# Patient Record
Sex: FEMALE | Race: WHITE | NOT HISPANIC OR LATINO | Employment: FULL TIME | URBAN - METROPOLITAN AREA
[De-identification: names, ages, dates, MRNs, and addresses within clinical notes are randomized per-mention and may not be internally consistent; named-entity substitution may affect disease eponyms.]

---

## 2017-12-05 ENCOUNTER — APPOINTMENT (OUTPATIENT)
Dept: WOMENS IMAGING | Facility: HOSPITAL | Age: 57
End: 2017-12-05

## 2017-12-05 PROCEDURE — 77063 BREAST TOMOSYNTHESIS BI: CPT | Performed by: RADIOLOGY

## 2017-12-05 PROCEDURE — 77067 SCR MAMMO BI INCL CAD: CPT | Performed by: RADIOLOGY

## 2019-03-07 ENCOUNTER — APPOINTMENT (OUTPATIENT)
Dept: WOMENS IMAGING | Facility: HOSPITAL | Age: 59
End: 2019-03-07

## 2019-03-07 PROCEDURE — 77067 SCR MAMMO BI INCL CAD: CPT | Performed by: RADIOLOGY

## 2019-03-07 PROCEDURE — 77063 BREAST TOMOSYNTHESIS BI: CPT | Performed by: RADIOLOGY

## 2019-12-16 ENCOUNTER — APPOINTMENT (OUTPATIENT)
Dept: WOMENS IMAGING | Facility: HOSPITAL | Age: 59
End: 2019-12-16

## 2019-12-16 PROCEDURE — 76641 ULTRASOUND BREAST COMPLETE: CPT | Performed by: RADIOLOGY

## 2019-12-16 PROCEDURE — G0279 TOMOSYNTHESIS, MAMMO: HCPCS | Performed by: RADIOLOGY

## 2019-12-16 PROCEDURE — 77065 DX MAMMO INCL CAD UNI: CPT | Performed by: RADIOLOGY

## 2019-12-16 PROCEDURE — 77061 BREAST TOMOSYNTHESIS UNI: CPT | Performed by: RADIOLOGY

## 2020-01-03 ENCOUNTER — APPOINTMENT (OUTPATIENT)
Dept: WOMENS IMAGING | Facility: HOSPITAL | Age: 60
End: 2020-01-03

## 2020-01-03 PROCEDURE — 76942 ECHO GUIDE FOR BIOPSY: CPT | Performed by: RADIOLOGY

## 2020-01-03 PROCEDURE — 19000 PUNCTURE ASPIR CYST BREAST: CPT | Performed by: RADIOLOGY

## 2020-03-10 ENCOUNTER — APPOINTMENT (OUTPATIENT)
Dept: WOMENS IMAGING | Facility: HOSPITAL | Age: 60
End: 2020-03-10

## 2020-09-18 ENCOUNTER — APPOINTMENT (OUTPATIENT)
Dept: WOMENS IMAGING | Facility: HOSPITAL | Age: 60
End: 2020-09-18

## 2020-09-18 PROCEDURE — 77061 BREAST TOMOSYNTHESIS UNI: CPT | Performed by: RADIOLOGY

## 2020-09-18 PROCEDURE — 76641 ULTRASOUND BREAST COMPLETE: CPT | Performed by: RADIOLOGY

## 2020-09-18 PROCEDURE — G0279 TOMOSYNTHESIS, MAMMO: HCPCS | Performed by: RADIOLOGY

## 2020-09-18 PROCEDURE — 77065 DX MAMMO INCL CAD UNI: CPT | Performed by: RADIOLOGY

## 2020-10-02 ENCOUNTER — APPOINTMENT (OUTPATIENT)
Dept: WOMENS IMAGING | Facility: HOSPITAL | Age: 60
End: 2020-10-02

## 2020-10-02 PROCEDURE — 76642 ULTRASOUND BREAST LIMITED: CPT | Performed by: RADIOLOGY

## 2021-04-16 ENCOUNTER — APPOINTMENT (OUTPATIENT)
Dept: WOMENS IMAGING | Facility: HOSPITAL | Age: 61
End: 2021-04-16

## 2021-04-16 PROCEDURE — 77067 SCR MAMMO BI INCL CAD: CPT | Performed by: RADIOLOGY

## 2021-04-16 PROCEDURE — 77063 BREAST TOMOSYNTHESIS BI: CPT | Performed by: RADIOLOGY

## 2022-08-30 ENCOUNTER — APPOINTMENT (OUTPATIENT)
Dept: WOMENS IMAGING | Facility: HOSPITAL | Age: 62
End: 2022-08-30

## 2022-08-30 PROCEDURE — 77067 SCR MAMMO BI INCL CAD: CPT | Performed by: RADIOLOGY

## 2022-08-30 PROCEDURE — 77063 BREAST TOMOSYNTHESIS BI: CPT | Performed by: RADIOLOGY

## 2022-09-09 ENCOUNTER — TRANSCRIBE ORDERS (OUTPATIENT)
Dept: ADMINISTRATIVE | Facility: HOSPITAL | Age: 62
End: 2022-09-09

## 2022-09-09 DIAGNOSIS — Z82.49 FAMILY HISTORY OF CORONARY ARTERY DISEASE: Primary | ICD-10-CM

## 2022-11-22 ENCOUNTER — APPOINTMENT (OUTPATIENT)
Dept: CT IMAGING | Facility: HOSPITAL | Age: 62
End: 2022-11-22

## 2023-03-14 ENCOUNTER — DOCUMENTATION (OUTPATIENT)
Dept: INTERNAL MEDICINE | Facility: HOSPITAL | Age: 63
End: 2023-03-14
Payer: COMMERCIAL

## 2023-04-26 ENCOUNTER — OFFICE VISIT (OUTPATIENT)
Dept: CARDIOLOGY | Facility: CLINIC | Age: 63
End: 2023-04-26
Payer: COMMERCIAL

## 2023-04-26 VITALS
BODY MASS INDEX: 25.11 KG/M2 | HEART RATE: 64 BPM | SYSTOLIC BLOOD PRESSURE: 110 MMHG | WEIGHT: 160 LBS | DIASTOLIC BLOOD PRESSURE: 72 MMHG | HEIGHT: 67 IN

## 2023-04-26 DIAGNOSIS — Z82.49 FAMILY HISTORY OF MYOCARDIAL INFARCTION: Primary | ICD-10-CM

## 2023-04-26 DIAGNOSIS — E78.2 MIXED HYPERLIPIDEMIA: ICD-10-CM

## 2023-04-26 DIAGNOSIS — R01.1 HEART MURMUR: ICD-10-CM

## 2023-04-26 NOTE — PROGRESS NOTES
Date of Office Visit: 2023  Encounter Provider: Perla Smith MD  Place of Service: [facility] CARDIOLOGY  Patient Name: Samreen Linda  :1960      Patient ID:  Samreen Linda is a 62 y.o. female is here for family history of myocardial infarction.          History of Present Illness    She has no medical illnesses requiring prescription medications.  She is here for family history myocardial infarction.    Her dad had myocardial infarction between the age of 60-65, her mom had myocardial infarction to the age of 60-65.  Her maternal grandparents both had coronary artery disease with her maternal grandmother dying at 76 with myocardial infarction and her maternal grandfather dying at 85 with myocardial infarction.  Her paternal grandfather  of myocardial infarction at 76.  She is , has 1 child, works as an  in [employer], has never smoked, uses no alcohol, has tea daily and no drugs.    Labs done 10/2022 showed total cholesterol 166, HDL 42, triglycerides 94, , non-, magnesium 2.3, glucose 92, otherwise normal CMP, normal CBC.    She gardens and walks 30 minutes 5 days weekly.  She is able to do this without chest tightness or pressure.  Sometimes she has some mild chest pressure when she is anxious.  Her job is stressful.  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  She does not feel her heart racing or skipping is had no dizziness, syncope or falls.  She has no orthopnea or PND.  Generally she is feeling well.     Past Medical History:   Diagnosis Date   • Family history of heart attack    • Mixed hyperlipidemia 2023         Past Surgical History:   Procedure Laterality Date   • APPENDECTOMY     •  SECTION     • FOOT SURGERY         Current Outpatient Medications on File Prior to Visit   Medication Sig Dispense Refill   •  "Calcium-Phosphorus-Vitamin D (GELATIN/CALCIUM/VITAMIN D PO) Take 1 each by mouth Daily.     • Cholecalciferol (VITAMIN D) 1000 units tablet Take 5 tablets by mouth Daily.     • MAGNESIUM PO Take  by mouth.     • [DISCONTINUED] DEXCHLORPHEN-PSE-METHSCOP PO Take  by mouth.       No current facility-administered medications on file prior to visit.       Social History     Socioeconomic History   • Marital status:    • Number of children: 1   Tobacco Use   • Smoking status: Never     Passive exposure: Never   • Smokeless tobacco: Never   • Tobacco comments:     Caffeine: tea daily   Vaping Use   • Vaping Use: Never used   Substance and Sexual Activity   • Alcohol use: No   • Drug use: Never   • Sexual activity: Yes     Birth control/protection: Post-menopausal           ROS    Procedures    ECG 12 Lead    Date/Time: 4/26/2023 11:28 AM  Performed by: Perla Smith MD  Authorized by: Perla Smith MD   Comparison: compared with previous ECG   Similar to previous ECG  Rhythm: sinus rhythm    Clinical impression: normal ECG                Objective:      Vitals:    04/26/23 1120   BP: 110/72   Pulse: 64   Weight: 72.6 kg (160 lb)   Height: 170.2 cm (67\")     Body mass index is 25.06 kg/m².    Vitals reviewed.   Constitutional:       General: Not in acute distress.     Appearance: Well-developed. Not diaphoretic.   Eyes:      General: No scleral icterus.     Conjunctiva/sclera: Conjunctivae normal.   HENT:      Head: Normocephalic and atraumatic.   Neck:      Thyroid: No thyromegaly.      Vascular: No carotid bruit or JVD.      Lymphadenopathy: No cervical adenopathy.   Pulmonary:      Effort: Pulmonary effort is normal. No respiratory distress.      Breath sounds: Normal breath sounds. No wheezing. No rhonchi. No rales.   Chest:      Chest wall: Not tender to palpatation.   Cardiovascular:      Normal rate. Regular rhythm.      Murmurs: There is a grade 2/6 holosystolic murmur at the URSB and ULSB.    "   No gallop.   Pulses:     Intact distal pulses.   Edema:     Peripheral edema absent.   Abdominal:      General: Bowel sounds are normal. There is no distension or abdominal bruit.      Palpations: Abdomen is soft. There is no abdominal mass.      Tenderness: There is no abdominal tenderness.   Musculoskeletal:         General: No deformity.      Extremities: No clubbing present.     Cervical back: Neck supple. Skin:     General: Skin is warm and dry. There is no cyanosis.      Coloration: Skin is not pale.      Findings: No rash.   Neurological:      Mental Status: Alert and oriented to person, place, and time.      Cranial Nerves: No cranial nerve deficit.   Psychiatric:         Judgment: Judgment normal.         Lab Review:       Assessment:      Diagnosis Plan   1. Family history of myocardial infarction  CT Cardiac Calcium Score Without Dye    Adult Transthoracic Echo Complete W/ Cont if Necessary Per Protocol      2. Heart murmur  Adult Transthoracic Echo Complete W/ Cont if Necessary Per Protocol      3. Mixed hyperlipidemia          1. History myocardial infarction, CAD.  Set up calcium score and vascular screening at [facility].  2. Murmur on examination, arrange echocardiogram  3. Mixed hyperlipidemia with high LDL and low HDL, no medication changes for now.  She may need statin therapies if she has an elevated calcium score.     Plan:       No medication changes at this time, set up testing as noted above, no follow-up for now.

## 2023-05-02 ENCOUNTER — TRANSCRIBE ORDERS (OUTPATIENT)
Dept: ADMINISTRATIVE | Facility: HOSPITAL | Age: 63
End: 2023-05-02
Payer: COMMERCIAL

## 2023-05-02 DIAGNOSIS — Z13.9 SCREENING DUE: Primary | ICD-10-CM

## 2023-05-05 ENCOUNTER — HOSPITAL ENCOUNTER (OUTPATIENT)
Dept: CT IMAGING | Facility: HOSPITAL | Age: 63
Discharge: HOME | End: 2023-05-05

## 2023-05-05 ENCOUNTER — TELEPHONE (OUTPATIENT)
Dept: CARDIOLOGY | Facility: CLINIC | Age: 63
End: 2023-05-05
Payer: COMMERCIAL

## 2023-05-05 DIAGNOSIS — Z82.49 FAMILY HISTORY OF MYOCARDIAL INFARCTION: ICD-10-CM

## 2023-05-05 PROCEDURE — 75571 CT HRT W/O DYE W/CA TEST: CPT

## 2023-05-05 NOTE — TELEPHONE ENCOUNTER
I called and left message to call back about calcium score, total score is 28-calcification in the mid LAD only, small to moderate size hiatal hernia noted.      Would recommend low-dose statin therapy given her family history and now calcification of the LAD.

## 2023-05-09 DIAGNOSIS — K44.9 HIATAL HERNIA: Primary | ICD-10-CM

## 2023-05-10 ENCOUNTER — HOSPITAL ENCOUNTER (OUTPATIENT)
Dept: CARDIOLOGY | Facility: HOSPITAL | Age: 63
Discharge: HOME | End: 2023-05-10
Admitting: INTERNAL MEDICINE
Payer: COMMERCIAL

## 2023-05-10 ENCOUNTER — TELEPHONE (OUTPATIENT)
Dept: CARDIOLOGY | Facility: CLINIC | Age: 63
End: 2023-05-10
Payer: COMMERCIAL

## 2023-05-10 VITALS
DIASTOLIC BLOOD PRESSURE: 50 MMHG | BODY MASS INDEX: 25.26 KG/M2 | HEIGHT: 67 IN | SYSTOLIC BLOOD PRESSURE: 116 MMHG | HEART RATE: 55 BPM | WEIGHT: 160.94 LBS

## 2023-05-10 DIAGNOSIS — Z82.49 FAMILY HISTORY OF MYOCARDIAL INFARCTION: ICD-10-CM

## 2023-05-10 DIAGNOSIS — I35.1 NONRHEUMATIC AORTIC VALVE INSUFFICIENCY: Primary | ICD-10-CM

## 2023-05-10 DIAGNOSIS — R01.1 HEART MURMUR: ICD-10-CM

## 2023-05-10 LAB
AORTIC DIMENSIONLESS INDEX: 0.8 (DI)
ASCENDING AORTA: 2.5 CM
BH CV ECHO MEAS - ACS: 1.6 CM
BH CV ECHO MEAS - AI P1/2T: 714.1 MSEC
BH CV ECHO MEAS - AO MAX PG: 15.3 MMHG
BH CV ECHO MEAS - AO MEAN PG: 6.1 MMHG
BH CV ECHO MEAS - AO ROOT DIAM: 2.03 CM
BH CV ECHO MEAS - AO V2 MAX: 195.5 CM/SEC
BH CV ECHO MEAS - AO V2 VTI: 38.3 CM
BH CV ECHO MEAS - AVA(I,D): 2.11 CM2
BH CV ECHO MEAS - EDV(CUBED): 103.6 ML
BH CV ECHO MEAS - EDV(MOD-SP2): 120 ML
BH CV ECHO MEAS - EDV(MOD-SP4): 108 ML
BH CV ECHO MEAS - EF(MOD-BP): 66.8 %
BH CV ECHO MEAS - EF(MOD-SP2): 68.3 %
BH CV ECHO MEAS - EF(MOD-SP4): 64.8 %
BH CV ECHO MEAS - ESV(CUBED): 25.7 ML
BH CV ECHO MEAS - ESV(MOD-SP2): 38 ML
BH CV ECHO MEAS - ESV(MOD-SP4): 38 ML
BH CV ECHO MEAS - FS: 37.1 %
BH CV ECHO MEAS - IVS/LVPW: 1.01 CM
BH CV ECHO MEAS - IVSD: 0.92 CM
BH CV ECHO MEAS - LAT PEAK E' VEL: 10.2 CM/SEC
BH CV ECHO MEAS - LV DIASTOLIC VOL/BSA (35-75): 58.6 CM2
BH CV ECHO MEAS - LV MASS(C)D: 145.3 GRAMS
BH CV ECHO MEAS - LV MAX PG: 10 MMHG
BH CV ECHO MEAS - LV MEAN PG: 4.1 MMHG
BH CV ECHO MEAS - LV SYSTOLIC VOL/BSA (12-30): 20.6 CM2
BH CV ECHO MEAS - LV V1 MAX: 158.3 CM/SEC
BH CV ECHO MEAS - LV V1 VTI: 31.5 CM
BH CV ECHO MEAS - LVIDD: 4.7 CM
BH CV ECHO MEAS - LVIDS: 3 CM
BH CV ECHO MEAS - LVOT AREA: 2.6 CM2
BH CV ECHO MEAS - LVOT DIAM: 1.81 CM
BH CV ECHO MEAS - LVPWD: 0.91 CM
BH CV ECHO MEAS - MED PEAK E' VEL: 11.6 CM/SEC
BH CV ECHO MEAS - MR MAX PG: 58.5 MMHG
BH CV ECHO MEAS - MR MAX VEL: 382.5 CM/SEC
BH CV ECHO MEAS - MV A DUR: 0.17 SEC
BH CV ECHO MEAS - MV A MAX VEL: 101.2 CM/SEC
BH CV ECHO MEAS - MV DEC SLOPE: 363.9 CM/SEC2
BH CV ECHO MEAS - MV DEC TIME: 0.24 MSEC
BH CV ECHO MEAS - MV E MAX VEL: 88.6 CM/SEC
BH CV ECHO MEAS - MV E/A: 0.88
BH CV ECHO MEAS - MV MAX PG: 5 MMHG
BH CV ECHO MEAS - MV MEAN PG: 1.79 MMHG
BH CV ECHO MEAS - MV P1/2T: 89.6 MSEC
BH CV ECHO MEAS - MV V2 VTI: 42.3 CM
BH CV ECHO MEAS - MVA(P1/2T): 2.46 CM2
BH CV ECHO MEAS - MVA(VTI): 1.91 CM2
BH CV ECHO MEAS - PA ACC TIME: 0.14 SEC
BH CV ECHO MEAS - PA PR(ACCEL): 16 MMHG
BH CV ECHO MEAS - PA V2 MAX: 96.4 CM/SEC
BH CV ECHO MEAS - PULM A REVS DUR: 0.13 SEC
BH CV ECHO MEAS - PULM A REVS VEL: 42 CM/SEC
BH CV ECHO MEAS - PULM DIAS VEL: 33.1 CM/SEC
BH CV ECHO MEAS - PULM S/D: 1.1
BH CV ECHO MEAS - PULM SYS VEL: 36.6 CM/SEC
BH CV ECHO MEAS - RAP SYSTOLE: 3 MMHG
BH CV ECHO MEAS - RV MAX PG: 1.59 MMHG
BH CV ECHO MEAS - RV V1 MAX: 63 CM/SEC
BH CV ECHO MEAS - RV V1 VTI: 12.4 CM
BH CV ECHO MEAS - RVSP: 24 MMHG
BH CV ECHO MEAS - SI(MOD-SP2): 44.5 ML/M2
BH CV ECHO MEAS - SI(MOD-SP4): 38 ML/M2
BH CV ECHO MEAS - SV(LVOT): 80.8 ML
BH CV ECHO MEAS - SV(MOD-SP2): 82 ML
BH CV ECHO MEAS - SV(MOD-SP4): 70 ML
BH CV ECHO MEAS - TAPSE (>1.6): 2.7 CM
BH CV ECHO MEAS - TR MAX PG: 20.6 MMHG
BH CV ECHO MEAS - TR MAX VEL: 226.7 CM/SEC
BH CV ECHO MEASUREMENTS AVERAGE E/E' RATIO: 8.13
BH CV XLRA - RV BASE: 2.9 CM
BH CV XLRA - RV LENGTH: 8 CM
BH CV XLRA - RV MID: 2.35 CM
BH CV XLRA - TDI S': 15.5 CM/SEC
LEFT ATRIUM VOLUME INDEX: 27.8 ML/M2
MAXIMAL PREDICTED HEART RATE: 159 BPM
SINUS: 2.6 CM
STJ: 2.2 CM
STRESS TARGET HR: 135 BPM

## 2023-05-10 PROCEDURE — 93306 TTE W/DOPPLER COMPLETE: CPT

## 2023-05-10 PROCEDURE — 93306 TTE W/DOPPLER COMPLETE: CPT | Performed by: INTERNAL MEDICINE

## 2023-05-10 PROCEDURE — 25510000001 PERFLUTREN (DEFINITY) 8.476 MG IN SODIUM CHLORIDE (PF) 0.9 % 10 ML INJECTION: Performed by: INTERNAL MEDICINE

## 2023-05-10 RX ADMIN — PERFLUTREN 3 ML: 6.52 INJECTION, SUSPENSION INTRAVENOUS at 10:09

## 2023-05-18 ENCOUNTER — TELEPHONE (OUTPATIENT)
Dept: GASTROENTEROLOGY | Facility: CLINIC | Age: 63
End: 2023-05-18
Payer: COMMERCIAL

## 2023-05-18 NOTE — TELEPHONE ENCOUNTER
Caller: SUAD MEZA   Relationship to Patient: SELF  Phone Number: [phone]  Reason for Call: PT RENAN APPT FORIN OFC BUT WANTED TO CHECK IF THERE WERE ANY VIRTUAL APPTS AND WOULD LIKE A CALL BACK IF POSSIBLE.

## 2023-06-22 ENCOUNTER — OFFICE (OUTPATIENT)
Dept: URBAN - METROPOLITAN AREA CLINIC 76 | Facility: CLINIC | Age: 63
End: 2023-06-22

## 2023-06-22 VITALS
HEIGHT: 67 IN | OXYGEN SATURATION: 98 % | DIASTOLIC BLOOD PRESSURE: 75 MMHG | SYSTOLIC BLOOD PRESSURE: 106 MMHG | WEIGHT: 160 LBS | HEART RATE: 68 BPM

## 2023-06-22 DIAGNOSIS — K44.9 DIAPHRAGMATIC HERNIA WITHOUT OBSTRUCTION OR GANGRENE: ICD-10-CM

## 2023-06-22 DIAGNOSIS — K21.9 GASTRO-ESOPHAGEAL REFLUX DISEASE WITHOUT ESOPHAGITIS: ICD-10-CM

## 2023-06-22 PROCEDURE — 99203 OFFICE O/P NEW LOW 30 MIN: CPT | Performed by: INTERNAL MEDICINE

## 2023-10-31 ENCOUNTER — OFFICE VISIT (OUTPATIENT)
Dept: GASTROENTEROLOGY | Facility: CLINIC | Age: 63
End: 2023-10-31
Payer: COMMERCIAL

## 2023-10-31 VITALS
HEART RATE: 81 BPM | DIASTOLIC BLOOD PRESSURE: 67 MMHG | SYSTOLIC BLOOD PRESSURE: 98 MMHG | HEIGHT: 67 IN | TEMPERATURE: 96.8 F | OXYGEN SATURATION: 97 % | WEIGHT: 160 LBS | BODY MASS INDEX: 25.11 KG/M2

## 2023-10-31 DIAGNOSIS — R13.10 DYSPHAGIA, UNSPECIFIED TYPE: Primary | ICD-10-CM

## 2023-10-31 DIAGNOSIS — K44.9 HIATAL HERNIA: ICD-10-CM

## 2023-10-31 PROCEDURE — 99204 OFFICE O/P NEW MOD 45 MIN: CPT | Performed by: NURSE PRACTITIONER

## 2023-10-31 NOTE — PROGRESS NOTES
Chief Complaint   Patient presents with    Hiatal Hernia       HPI    Samreen Linda is a  63 y.o. female here to establish care as a new patient for hiatal hernia.    Past medical history of hyperlipidemia.    Recent screening cardiac CT with incidental finding of small-moderate hiatal hernia.    On visit today her only complaint is rare episodes of dysphagia associated with rice.  She takes Tums throughout the week.  She does not find heartburn all that troublesome.  No nausea, vomiting, poor appetite or weight loss.  Current BMI is 25.06.    No diarrhea, constipation or rectal bleeding.  She had a normal colonoscopy with Dr. Ward in  with plans to repeat in 10 years.  No family history of colon cancer.    Past Medical History:   Diagnosis Date    Family history of heart attack     Mixed hyperlipidemia 2023       Past Surgical History:   Procedure Laterality Date    APPENDECTOMY       SECTION      FOOT SURGERY         Scheduled Meds:     Continuous Infusions: No current facility-administered medications for this visit.      PRN Meds:     Allergies   Allergen Reactions    Fexofenadine-Pseudoephed Er Other (See Comments)     Facial muscle spasms, migraines       Social History     Socioeconomic History    Marital status:     Number of children: 1   Tobacco Use    Smoking status: Never     Passive exposure: Never    Smokeless tobacco: Never    Tobacco comments:     Caffeine: tea daily   Vaping Use    Vaping Use: Never used   Substance and Sexual Activity    Alcohol use: No    Drug use: Never    Sexual activity: Yes     Birth control/protection: Post-menopausal       Family History   Problem Relation Age of Onset    Dementia Mother     Hypertension Mother     Asthma Mother     Heart attack Mother     Heart disease Mother     Heart disease Father     Arrhythmia Father     Heart attack Father     Cancer Paternal Uncle     Heart attack Maternal Grandmother     Heart disease Maternal Grandmother      Hypertension Maternal Grandfather     Heart attack Maternal Grandfather     Heart disease Maternal Grandfather     Cancer Paternal Grandmother     Heart attack Paternal Grandfather     Heart disease Paternal Grandfather        Review of Systems   HENT:  Positive for trouble swallowing.        Vitals:    10/31/23 1255   BP: 98/67   Pulse: 81   Temp: 96.8 °F (36 °C)   SpO2: 97%       Physical Exam  Constitutional:       Appearance: She is well-developed.   Abdominal:      General: Bowel sounds are normal. There is no distension.      Palpations: Abdomen is soft. There is no mass.      Tenderness: There is no abdominal tenderness. There is no guarding.      Hernia: No hernia is present.   Skin:     General: Skin is warm and dry.      Capillary Refill: Capillary refill takes less than 2 seconds.   Neurological:      Mental Status: She is alert and oriented to person, place, and time.   Psychiatric:         Behavior: Behavior normal.     Assessment    Diagnoses and all orders for this visit:    1. Dysphagia, unspecified type (Primary)  -     FL esophagram complete; Future    2. Hiatal hernia       Plan    Pleasant 63-year-old female seen today for incidental findings of hiatal hernia.  We reviewed causes for hiatal hernia as well as symptomology and appropriate treatment.  She seems to be doing well at the moment outside of rare episodes of dysphagia associated with rice.  We did discuss esophagram for further evaluation of symptoms and she is agreeable.  Encouraged antireflux dietary precautions.  Recommend FD guard for occasional heartburn.  Further recommendations pending imaging.         IRISH Handley  [facility]  [address] [place] [address]  Office: [phone]

## 2024-02-13 ENCOUNTER — HOSPITAL ENCOUNTER (OUTPATIENT)
Dept: GENERAL RADIOLOGY | Facility: HOSPITAL | Age: 64
Discharge: HOME | End: 2024-02-13
Admitting: NURSE PRACTITIONER
Payer: COMMERCIAL

## 2024-02-13 DIAGNOSIS — R13.10 DYSPHAGIA, UNSPECIFIED TYPE: ICD-10-CM

## 2024-02-13 PROCEDURE — 74220 X-RAY XM ESOPHAGUS 1CNTRST: CPT

## 2024-02-13 RX ADMIN — ANTACID/ANTIFLATULENT 1 PACKET: 380; 550; 10; 10 GRANULE, EFFERVESCENT ORAL at 10:49

## 2024-02-13 RX ADMIN — BARIUM SULFATE 135 ML: 980 POWDER, FOR SUSPENSION ORAL at 10:49

## 2024-02-13 RX ADMIN — BARIUM SULFATE 183 ML: 960 POWDER, FOR SUSPENSION ORAL at 10:48

## 2024-02-19 ENCOUNTER — TELEPHONE (OUTPATIENT)
Dept: GASTROENTEROLOGY | Facility: CLINIC | Age: 64
End: 2024-02-19
Payer: COMMERCIAL

## 2024-02-19 NOTE — TELEPHONE ENCOUNTER
----- Message from IRISH Handley sent at 2/16/2024  4:00 PM EST -----  Please inform the patient imaging shows a small paraesophageal hernia and a narrowing at the base of her esophagus.  Would recommend EGD for further evaluation if she is agreeable.  Let me know if she would like to proceed and we can set it up accord  ingly.  Would also offer starting her on 40 mg a day of Protonix in the meanwhile.

## 2024-02-22 ENCOUNTER — TELEPHONE (OUTPATIENT)
Dept: GASTROENTEROLOGY | Facility: CLINIC | Age: 64
End: 2024-02-22
Payer: COMMERCIAL

## 2024-02-22 DIAGNOSIS — R13.10 DYSPHAGIA, UNSPECIFIED TYPE: Primary | ICD-10-CM

## 2024-02-22 DIAGNOSIS — R93.3 ABNORMAL ESOPHAGRAM: ICD-10-CM

## 2024-02-22 DIAGNOSIS — K44.9 HIATAL HERNIA: ICD-10-CM

## 2024-02-22 RX ORDER — PANTOPRAZOLE SODIUM 40 MG/1
40 TABLET, DELAYED RELEASE ORAL DAILY
Qty: 90 TABLET | Refills: 3 | Status: SHIPPED | OUTPATIENT
Start: 2024-02-22

## 2024-02-22 NOTE — TELEPHONE ENCOUNTER
RAGHAVENDRA JOHN IN SCHEDULING PT SCHEDULED 07/08/2024 ARRIVING AT 0830AM,EGD PREP INSTRUCTIONS MAILED TO ADDRESS ON FILE VERIFIED BY PT.OK FOR HUB TO READ

## 2024-02-22 NOTE — TELEPHONE ENCOUNTER
Called pt and advised of Marina JOHN's note.  Pt verbalized understanding.    Pt is agreeable to recommendations for Protonix and EGD.    Update sent to DORA Gray.

## 2024-05-09 ENCOUNTER — TELEPHONE (OUTPATIENT)
Dept: CARDIOLOGY | Facility: CLINIC | Age: 64
End: 2024-05-09

## 2024-05-09 ENCOUNTER — OFFICE VISIT (OUTPATIENT)
Dept: CARDIOLOGY | Facility: CLINIC | Age: 64
End: 2024-05-09
Payer: COMMERCIAL

## 2024-05-09 ENCOUNTER — HOSPITAL ENCOUNTER (OUTPATIENT)
Dept: CARDIOLOGY | Facility: HOSPITAL | Age: 64
Discharge: HOME | End: 2024-05-09
Payer: COMMERCIAL

## 2024-05-09 VITALS
SYSTOLIC BLOOD PRESSURE: 120 MMHG | HEIGHT: 67 IN | DIASTOLIC BLOOD PRESSURE: 70 MMHG | WEIGHT: 159 LBS | HEART RATE: 51 BPM | BODY MASS INDEX: 24.96 KG/M2

## 2024-05-09 VITALS
WEIGHT: 160 LBS | HEIGHT: 67 IN | BODY MASS INDEX: 25.11 KG/M2 | DIASTOLIC BLOOD PRESSURE: 74 MMHG | SYSTOLIC BLOOD PRESSURE: 120 MMHG | HEART RATE: 70 BPM

## 2024-05-09 DIAGNOSIS — I25.84 CORONARY ARTERY CALCIFICATION: Primary | ICD-10-CM

## 2024-05-09 DIAGNOSIS — I35.1 NONRHEUMATIC AORTIC VALVE INSUFFICIENCY: ICD-10-CM

## 2024-05-09 DIAGNOSIS — Z82.49 FAMILY HISTORY OF MYOCARDIAL INFARCTION: ICD-10-CM

## 2024-05-09 DIAGNOSIS — I25.10 CORONARY ARTERY CALCIFICATION: Primary | ICD-10-CM

## 2024-05-09 DIAGNOSIS — K44.9 HIATAL HERNIA: ICD-10-CM

## 2024-05-09 LAB
AORTIC DIMENSIONLESS INDEX: 0.9 (DI)
ASCENDING AORTA: 2.6 CM
BH CV ECHO LEFT VENTRICLE GLOBAL LONGITUDINAL STRAIN: -25.3 %
BH CV ECHO MEAS - ACS: 1.72 CM
BH CV ECHO MEAS - AI P1/2T: 1430 MSEC
BH CV ECHO MEAS - AO MAX PG: 14.1 MMHG
BH CV ECHO MEAS - AO MEAN PG: 6.9 MMHG
BH CV ECHO MEAS - AO ROOT DIAM: 2.4 CM
BH CV ECHO MEAS - AO V2 MAX: 187.9 CM/SEC
BH CV ECHO MEAS - AO V2 VTI: 38.4 CM
BH CV ECHO MEAS - AVA(I,D): 2.8 CM2
BH CV ECHO MEAS - EDV(CUBED): 83 ML
BH CV ECHO MEAS - EDV(MOD-SP2): 68 ML
BH CV ECHO MEAS - EDV(MOD-SP4): 70 ML
BH CV ECHO MEAS - EF(MOD-BP): 60.7 %
BH CV ECHO MEAS - EF(MOD-SP2): 60.3 %
BH CV ECHO MEAS - EF(MOD-SP4): 61.4 %
BH CV ECHO MEAS - ESV(CUBED): 27.9 ML
BH CV ECHO MEAS - ESV(MOD-SP2): 27 ML
BH CV ECHO MEAS - ESV(MOD-SP4): 27 ML
BH CV ECHO MEAS - FS: 30.5 %
BH CV ECHO MEAS - IVS/LVPW: 1.06 CM
BH CV ECHO MEAS - IVSD: 0.81 CM
BH CV ECHO MEAS - LAT PEAK E' VEL: 10.1 CM/SEC
BH CV ECHO MEAS - LV DIASTOLIC VOL/BSA (35-75): 38.1 CM2
BH CV ECHO MEAS - LV MASS(C)D: 105.7 GRAMS
BH CV ECHO MEAS - LV MAX PG: 10.1 MMHG
BH CV ECHO MEAS - LV MEAN PG: 4.7 MMHG
BH CV ECHO MEAS - LV SYSTOLIC VOL/BSA (12-30): 14.7 CM2
BH CV ECHO MEAS - LV V1 MAX: 159 CM/SEC
BH CV ECHO MEAS - LV V1 VTI: 34.6 CM
BH CV ECHO MEAS - LVIDD: 4.4 CM
BH CV ECHO MEAS - LVIDS: 3 CM
BH CV ECHO MEAS - LVOT AREA: 3.1 CM2
BH CV ECHO MEAS - LVOT DIAM: 1.98 CM
BH CV ECHO MEAS - LVPWD: 0.77 CM
BH CV ECHO MEAS - MED PEAK E' VEL: 8.8 CM/SEC
BH CV ECHO MEAS - MR MAX PG: 110.5 MMHG
BH CV ECHO MEAS - MR MAX VEL: 525.6 CM/SEC
BH CV ECHO MEAS - MV A DUR: 0.16 SEC
BH CV ECHO MEAS - MV A MAX VEL: 71.7 CM/SEC
BH CV ECHO MEAS - MV DEC SLOPE: 412.6 CM/SEC2
BH CV ECHO MEAS - MV DEC TIME: 0.15 SEC
BH CV ECHO MEAS - MV E MAX VEL: 73.3 CM/SEC
BH CV ECHO MEAS - MV E/A: 1.02
BH CV ECHO MEAS - MV MAX PG: 3.3 MMHG
BH CV ECHO MEAS - MV MEAN PG: 1.29 MMHG
BH CV ECHO MEAS - MV P1/2T: 62.9 MSEC
BH CV ECHO MEAS - MV V2 VTI: 26.4 CM
BH CV ECHO MEAS - MVA(P1/2T): 3.5 CM2
BH CV ECHO MEAS - MVA(VTI): 4.1 CM2
BH CV ECHO MEAS - PA V2 MAX: 104.5 CM/SEC
BH CV ECHO MEAS - PULM A REVS DUR: 0.14 SEC
BH CV ECHO MEAS - PULM A REVS VEL: 33.2 CM/SEC
BH CV ECHO MEAS - PULM DIAS VEL: 37.4 CM/SEC
BH CV ECHO MEAS - PULM S/D: 1.09
BH CV ECHO MEAS - PULM SYS VEL: 40.7 CM/SEC
BH CV ECHO MEAS - RAP SYSTOLE: 3 MMHG
BH CV ECHO MEAS - RV MAX PG: 2.7 MMHG
BH CV ECHO MEAS - RV V1 MAX: 82 CM/SEC
BH CV ECHO MEAS - RV V1 VTI: 16.4 CM
BH CV ECHO MEAS - RVSP: 17.2 MMHG
BH CV ECHO MEAS - SV(LVOT): 106.9 ML
BH CV ECHO MEAS - SV(MOD-SP2): 41 ML
BH CV ECHO MEAS - SV(MOD-SP4): 43 ML
BH CV ECHO MEAS - SVI(LVOT): 58.1 ML/M2
BH CV ECHO MEAS - SVI(MOD-SP2): 22.3 ML/M2
BH CV ECHO MEAS - SVI(MOD-SP4): 23.4 ML/M2
BH CV ECHO MEAS - TR MAX PG: 14.2 MMHG
BH CV ECHO MEAS - TR MAX VEL: 188.7 CM/SEC
BH CV ECHO MEASUREMENTS AVERAGE E/E' RATIO: 7.76
BH CV XLRA - RV BASE: 3.6 CM
BH CV XLRA - RV LENGTH: 7.3 CM
BH CV XLRA - RV MID: 2.16 CM
BH CV XLRA - TDI S': 11.5 CM/SEC
LEFT ATRIUM VOLUME INDEX: 16.4 ML/M2
SINUS: 2.41 CM
STJ: 2.44 CM

## 2024-05-09 PROCEDURE — 93356 MYOCRD STRAIN IMG SPCKL TRCK: CPT

## 2024-05-09 PROCEDURE — 93306 TTE W/DOPPLER COMPLETE: CPT

## 2024-05-09 PROCEDURE — 93000 ELECTROCARDIOGRAM COMPLETE: CPT | Performed by: NURSE PRACTITIONER

## 2024-05-09 PROCEDURE — 99214 OFFICE O/P EST MOD 30 MIN: CPT | Performed by: NURSE PRACTITIONER

## 2024-05-09 RX ORDER — CLOBETASOL PROPIONATE 0.5 MG/G
1 OINTMENT TOPICAL AS NEEDED
COMMUNITY

## 2024-06-06 ENCOUNTER — TELEPHONE (OUTPATIENT)
Dept: GASTROENTEROLOGY | Facility: CLINIC | Age: 64
End: 2024-06-06
Payer: COMMERCIAL

## 2024-06-06 RX ORDER — PANTOPRAZOLE SODIUM 40 MG/1
40 TABLET, DELAYED RELEASE ORAL DAILY
Qty: 90 TABLET | Refills: 3 | Status: SHIPPED | OUTPATIENT
Start: 2024-06-06

## 2024-06-06 NOTE — TELEPHONE ENCOUNTER
Received prior authorization request for pantoprazole 40 mg but no current active prescription. Please advise

## 2024-06-10 ENCOUNTER — TELEPHONE (OUTPATIENT)
Dept: GASTROENTEROLOGY | Facility: CLINIC | Age: 64
End: 2024-06-10
Payer: COMMERCIAL

## 2024-06-11 ENCOUNTER — SPECIALTY PHARMACY (OUTPATIENT)
Dept: GASTROENTEROLOGY | Facility: CLINIC | Age: 64
End: 2024-06-11
Payer: COMMERCIAL

## 2024-06-11 NOTE — PROGRESS NOTES
Pantoprazole PA approved  Key: [id number] - PA Case ID: [id number]  this request is approved from 06/10/2024 to 06/10/2027  Left voicemail at pharmacy       Kamilla Hardy  Specialty Pharmacy Technician

## 2024-07-04 PROBLEM — M19.90 ARTHRITIS: Status: ACTIVE | Noted: 2022-11-17

## 2024-07-04 PROBLEM — H43.399 FLOATERS IN VISUAL FIELD: Status: ACTIVE | Noted: 2023-10-10

## 2024-07-04 PROBLEM — K21.9 GASTRO-ESOPHAGEAL REFLUX DISEASE WITHOUT ESOPHAGITIS: Status: ACTIVE | Noted: 2024-07-04

## 2024-07-04 PROBLEM — H52.13 BILATERAL MYOPIA: Status: ACTIVE | Noted: 2023-10-10

## 2024-07-04 PROBLEM — Z82.49 FAMILY HISTORY OF HEART DISEASE: Status: ACTIVE | Noted: 2022-11-17

## 2024-07-04 PROBLEM — R63.5 WEIGHT GAIN: Status: ACTIVE | Noted: 2018-04-18

## 2024-07-04 PROBLEM — Z00.00 ANNUAL PHYSICAL EXAM: Status: ACTIVE | Noted: 2017-04-27

## 2024-07-04 PROBLEM — H35.379 EPIRETINAL MEMBRANE: Status: ACTIVE | Noted: 2023-10-10

## 2024-07-04 PROBLEM — K44.9 HIATAL HERNIA: Status: ACTIVE | Noted: 2024-07-04

## 2024-07-04 PROBLEM — H25.13 AGE-RELATED NUCLEAR CATARACT OF BOTH EYES: Status: ACTIVE | Noted: 2023-10-10

## 2024-07-08 ENCOUNTER — HOSPITAL ENCOUNTER (OUTPATIENT)
Facility: HOSPITAL | Age: 64
Setting detail: HOSPITAL OUTPATIENT SURGERY
Discharge: HOME | End: 2024-07-08
Attending: INTERNAL MEDICINE | Admitting: INTERNAL MEDICINE
Payer: COMMERCIAL

## 2024-07-08 ENCOUNTER — ANESTHESIA (OUTPATIENT)
Dept: GASTROENTEROLOGY | Facility: HOSPITAL | Age: 64
End: 2024-07-08
Payer: COMMERCIAL

## 2024-07-08 ENCOUNTER — ANESTHESIA EVENT (OUTPATIENT)
Dept: GASTROENTEROLOGY | Facility: HOSPITAL | Age: 64
End: 2024-07-08
Payer: COMMERCIAL

## 2024-07-08 VITALS
SYSTOLIC BLOOD PRESSURE: 133 MMHG | HEIGHT: 67 IN | HEART RATE: 50 BPM | WEIGHT: 153 LBS | OXYGEN SATURATION: 98 % | RESPIRATION RATE: 16 BRPM | BODY MASS INDEX: 24.01 KG/M2 | DIASTOLIC BLOOD PRESSURE: 85 MMHG

## 2024-07-08 DIAGNOSIS — R93.3 ABNORMAL ESOPHAGRAM: ICD-10-CM

## 2024-07-08 DIAGNOSIS — R13.10 DYSPHAGIA, UNSPECIFIED TYPE: ICD-10-CM

## 2024-07-08 DIAGNOSIS — K44.9 HIATAL HERNIA: ICD-10-CM

## 2024-07-08 PROCEDURE — 25810000003 LACTATED RINGERS PER 1000 ML: Performed by: INTERNAL MEDICINE

## 2024-07-08 PROCEDURE — S0260 H&P FOR SURGERY: HCPCS | Performed by: INTERNAL MEDICINE

## 2024-07-08 PROCEDURE — 25010000002 PROPOFOL 10 MG/ML EMULSION: Performed by: REGISTERED NURSE

## 2024-07-08 PROCEDURE — 88305 TISSUE EXAM BY PATHOLOGIST: CPT | Performed by: INTERNAL MEDICINE

## 2024-07-08 RX ORDER — PROPOFOL 10 MG/ML
VIAL (ML) INTRAVENOUS AS NEEDED
Status: DISCONTINUED | OUTPATIENT
Start: 2024-07-08 | End: 2024-07-08 | Stop reason: SURG

## 2024-07-08 RX ORDER — SODIUM CHLORIDE, SODIUM LACTATE, POTASSIUM CHLORIDE, CALCIUM CHLORIDE 600; 310; 30; 20 MG/100ML; MG/100ML; MG/100ML; MG/100ML
30 INJECTION, SOLUTION INTRAVENOUS CONTINUOUS PRN
Status: DISCONTINUED | OUTPATIENT
Start: 2024-07-08 | End: 2024-07-08 | Stop reason: HOSPADM

## 2024-07-08 RX ORDER — PANTOPRAZOLE SODIUM 40 MG/1
40 TABLET, DELAYED RELEASE ORAL DAILY
Qty: 90 TABLET | Refills: 3 | Status: SHIPPED | OUTPATIENT
Start: 2024-07-08

## 2024-07-08 RX ORDER — LIDOCAINE HYDROCHLORIDE 20 MG/ML
INJECTION, SOLUTION INFILTRATION; PERINEURAL AS NEEDED
Status: DISCONTINUED | OUTPATIENT
Start: 2024-07-08 | End: 2024-07-08 | Stop reason: SURG

## 2024-07-08 RX ADMIN — PROPOFOL 180 MCG/KG/MIN: 10 INJECTION, EMULSION INTRAVENOUS at 10:24

## 2024-07-08 RX ADMIN — PROPOFOL 60 MG: 10 INJECTION, EMULSION INTRAVENOUS at 10:24

## 2024-07-08 RX ADMIN — SODIUM CHLORIDE, POTASSIUM CHLORIDE, SODIUM LACTATE AND CALCIUM CHLORIDE: 600; 310; 30; 20 INJECTION, SOLUTION INTRAVENOUS at 10:19

## 2024-07-08 RX ADMIN — LIDOCAINE HYDROCHLORIDE 60 MG: 20 INJECTION, SOLUTION INFILTRATION; PERINEURAL at 10:24

## 2024-07-08 NOTE — ANESTHESIA POSTPROCEDURE EVALUATION
Patient: Samreen Linda    Procedure Summary       Date: 07/08/24 Room / Location:  [facility] / [facility] END[facility]COPY    Anesthesia Start: 1019 Anesthesia Stop: 1033    Procedure: ESOPHAGOGASTRODUODENOSCOPY with cold biopsies (Esophagus) Diagnosis:       Dysphagia, unspecified type      Hiatal hernia      Abnormal esophagram      Esophagitis      (Dysphagia, unspecified type [R13.10])      (Hiatal hernia [K44.9])      (Abnormal esophagram [R93.3])    Surgeons: Rico Reinoso MD Provider: Emmanuel Gomez MD    Anesthesia Type: MAC ASA Status: 2            Anesthesia Type: MAC    Vitals  Vitals Value Taken Time   /85 07/08/24 1052   Temp     Pulse 42 07/08/24 1056   Resp 16 07/08/24 1052   SpO2 99 % 07/08/24 1056   Vitals shown include unfiled device data.        Post Anesthesia Care and Evaluation    Patient location during evaluation: PACU  Patient participation: complete - patient participated  Level of consciousness: awake and alert  Pain management: adequate    Airway patency: patent  Anesthetic complications: No anesthetic complications    Cardiovascular status: acceptable  Respiratory status: acceptable  Hydration status: acceptable    Comments: --------------------            07/08/24               1052     --------------------   BP:       133/85     Pulse:      50       Resp:       16       SpO2:      98%      --------------------

## 2024-07-08 NOTE — H&P
[facility]  Pre Procedure History & Physical    Chief Complaint:   Hiatal hernia on imaging with possible paraesophageal component    Subjective     HPI:   Patient 63-year-old female with history of of coronary artery disease status post coronary imaging demonstrating a hiatal hernia referred to GI.  Patient reports rare episodes of dysphagia to rice otherwise negative here for EGD.    Past Medical History:   Past Medical History:   Diagnosis Date    Cataract     Coronary artery calcification 2024    COVID-19 2024    returned from [place] 24 and tested positive on 24    Family history of heart attack     GERD (gastroesophageal reflux disease)     Hiatal hernia     History of UTI     Mixed hyperlipidemia 2023    Trouble swallowing     dry foods, breads       Past Surgical History:  Past Surgical History:   Procedure Laterality Date    APPENDECTOMY       SECTION      COLONOSCOPY      FOOT SURGERY Bilateral     multiple foot surgeries       Family History:  Family History   Problem Relation Age of Onset    Dementia Mother     Hypertension Mother     Asthma Mother     Heart attack Mother     Heart disease Mother     Heart disease Father     Arrhythmia Father     Heart attack Father     Cancer Paternal Uncle     Heart attack Maternal Grandmother     Heart disease Maternal Grandmother     Hypertension Maternal Grandfather     Heart attack Maternal Grandfather     Heart disease Maternal Grandfather     Cancer Paternal Grandmother     Heart attack Paternal Grandfather     Heart disease Paternal Grandfather     Malig Hyperthermia Neg Hx        Social History:   reports that she has never smoked. She has never been exposed to tobacco smoke. She has never used smokeless tobacco. She reports that she does not drink alcohol and does not use drugs.    Medications:   Medications Prior to Admission   Medication Sig Dispense Refill Last Dose    azithromycin (ZITHROMAX) 500 MG  "tablet Take 1 tablet by mouth Daily for 5 days. 5 tablet 0 7/7/2024    Cholecalciferol (VITAMIN D) 1000 units tablet Take 5 tablets by mouth Daily.   7/7/2024    clobetasol (TEMOVATE) 0.05 % ointment Apply 1 Application topically to the appropriate area as directed As Needed.       Dexchlorphen-PSE-Methscop (D-HIST D PO) Take 1 tablet by mouth Daily.   Past Week    fluticasone (FLONASE) 50 MCG/ACT nasal spray 2 sprays into the nostril(s) as directed by provider Daily. 15.8 mL 0 7/7/2024    methylPREDNISolone (Medrol) 4 MG dose pack Take as directed on package instructions. (Patient taking differently: Take as directed on package instructions. STARTED ON 7/5/24) 1 each 0 7/7/2024    Multiple Minerals-Vitamins (MAGDIEL MAG ZINC +D3 PO) Take 1 tablet by mouth Daily.   7/7/2024    tretinoin (RETIN-A) 0.1 % cream APPLY 1 APPLICATION ON THE SKIN AT BEDTIME APPLY PEA SIZED AMOUNT TO FACE NIGHTLY AS TOLERATED.          Allergies:  Fexofenadine-pseudoephed er    ROS:    Pertinent items are noted in HPI     Objective     Blood pressure 108/88, pulse (!) 47, resp. rate 17, height 170.2 cm (67\"), weight 69.4 kg (153 lb), SpO2 99%.    Physical Exam   Constitutional: Pt is oriented to person, place, and time and well-developed, well-nourished, and in no distress.   Mouth/Throat: Oropharynx is clear and moist.   Neck: Normal range of motion.   Cardiovascular: Normal rate, regular rhythm and normal heart sounds.    Pulmonary/Chest: Effort normal and breath sounds normal.   Abdominal: Soft. Nontender  Skin: Skin is warm and dry.   Psychiatric: Mood, memory, affect and judgment normal.     Assessment & Plan     Diagnosis:  Abnormal esophagram    Anticipated Surgical Procedure:  EGD    The risks, benefits, and alternatives of this procedure have been discussed with the patient or the responsible party- the patient understands and agrees to proceed.                                                          "

## 2024-07-08 NOTE — ANESTHESIA PREPROCEDURE EVALUATION
Anesthesia Evaluation     Patient summary reviewed and Nursing notes reviewed                Airway   Mallampati: II  Dental      Pulmonary - negative pulmonary ROS   Cardiovascular     ECG reviewed  Rhythm: regular  Rate: normal    (+) valvular problems/murmurs AI, hyperlipidemia      Neuro/Psych- negative ROS  GI/Hepatic/Renal/Endo    (+) hiatal hernia, GERD    Musculoskeletal     Abdominal    Substance History - negative use     OB/GYN negative ob/gyn ROS         Other   arthritis,                 Anesthesia Plan    ASA 2     MAC     (Moderate AI  )  intravenous induction     Anesthetic plan, risks, benefits, and alternatives have been provided, discussed and informed consent has been obtained with: patient.    CODE STATUS:

## 2024-07-08 NOTE — BRIEF OP NOTE
ESOPHAGOGASTRODUODENOSCOPY  Progress Note    Samreen Linda  7/8/2024    Pre-op Diagnosis:   Dysphagia, unspecified type [R13.10]  Hiatal hernia [K44.9]  Abnormal esophagram [R93.3]       Post-Op Diagnosis Codes:     * Dysphagia, unspecified type [R13.10]     * Hiatal hernia [K44.9]     * Abnormal esophagram [R93.3]     * Esophagitis [K20.90]    Procedure/CPT® Codes:        Procedure(s):  ESOPHAGOGASTRODUODENOSCOPY with cold biopsies              Surgeon(s):  Rico Reinoso MD    Anesthesia: Monitored Anesthesia Care    Staff:   Endo Technician: Re Fuentes  Endo Nurse: Alba Sewell RN         Estimated Blood Loss: minimal    Urine Voided: * No values recorded between 7/8/2024 10:19 AM and 7/8/2024 10:30 AM *    Specimens:                Specimens       ID Source Type Tests Collected By Collected At Frozen?    A GE Junction Tissue TISSUE PATHOLOGY EXAM   Rico Reinoso MD 7/8/24 1029     Description: ge junction biopsies    This specimen was not marked as sent.                  Drains: * No LDAs found *    Findings: EGD with biopsies performed revealed distal reflux esophagitis biopsies of the GE junction were obtained.  Small sliding hiatal hernia was noted with normal gastric and duodenal mucosa seen.        Complications: None          Rico Reinoso MD     Date: 7/8/2024  Time: 10:31 EDT

## 2024-07-08 NOTE — DISCHARGE INSTRUCTIONS
For the next 24 hours patient needs to be with a responsible adult.    For 24 hours DO NOT drive, operate machinery, appliances, drink alcohol, make important decisions or sign legal documents.    Start with a light or bland diet and advance to regular diet as tolerated.    Follow recommendations on procedure report provided by your doctor.    Call Dr Reinoso for problems [phone]    Problems may include but not limited to: large amounts of bleeding, trouble breathing, repeated vomiting, severe unrelieved pain, fever or chills.

## 2024-07-09 LAB
LAB AP CASE REPORT: NORMAL
LAB AP DIAGNOSIS COMMENT: NORMAL
PATH REPORT.FINAL DX SPEC: NORMAL
PATH REPORT.GROSS SPEC: NORMAL

## 2024-07-30 ENCOUNTER — TELEPHONE (OUTPATIENT)
Dept: GASTROENTEROLOGY | Facility: CLINIC | Age: 64
End: 2024-07-30
Payer: COMMERCIAL

## 2024-07-30 NOTE — TELEPHONE ENCOUNTER
----- Message from Rico Reinoso sent at 7/29/2024 10:14 AM EDT -----  Biopsies with mixed inflammation, call for ongoing symptoms to consider changing PPI

## 2024-08-02 ENCOUNTER — TELEPHONE (OUTPATIENT)
Dept: GASTROENTEROLOGY | Facility: CLINIC | Age: 64
End: 2024-08-02
Payer: COMMERCIAL

## 2024-08-02 DIAGNOSIS — K29.70 GASTRITIS WITHOUT BLEEDING, UNSPECIFIED CHRONICITY, UNSPECIFIED GASTRITIS TYPE: Primary | ICD-10-CM

## 2024-08-02 RX ORDER — FAMOTIDINE 40 MG/1
40 TABLET, FILM COATED ORAL DAILY
Qty: 90 TABLET | Refills: 3 | Status: SHIPPED | OUTPATIENT
Start: 2024-08-02

## 2024-08-02 NOTE — TELEPHONE ENCOUNTER
Patient called. Questions answered regarding her scope results. She states she stopped taking Pantoprazole, it caused diarrhea and a migraine type headache.     She states she started taking Famotidine over the counter. Advised as per Marina to increase her Famotidine to 40 mg one tablet daily.     Patient verb understanding.

## 2024-08-02 NOTE — TELEPHONE ENCOUNTER
----- Message from [employer] sent at 8/2/2024  3:53 PM EDT -----  Regarding: Results  Contact: [phone]  Dear Rocio SIMPSON,     I do have some questions.    What number should I use to reach you?    Emilie Linda

## 2024-08-16 ENCOUNTER — TELEPHONE (OUTPATIENT)
Dept: GASTROENTEROLOGY | Facility: CLINIC | Age: 64
End: 2024-08-16

## 2024-12-20 ENCOUNTER — TRANSCRIBE ORDERS (OUTPATIENT)
Dept: CARDIOLOGY | Facility: HOSPITAL | Age: 64
End: 2024-12-20
Payer: COMMERCIAL

## 2024-12-20 ENCOUNTER — LAB (OUTPATIENT)
Dept: LAB | Facility: HOSPITAL | Age: 64
End: 2024-12-20
Payer: COMMERCIAL

## 2024-12-20 ENCOUNTER — TRANSCRIBE ORDERS (OUTPATIENT)
Dept: LAB | Facility: HOSPITAL | Age: 64
End: 2024-12-20
Payer: COMMERCIAL

## 2024-12-20 ENCOUNTER — HOSPITAL ENCOUNTER (OUTPATIENT)
Dept: CARDIOLOGY | Facility: HOSPITAL | Age: 64
Discharge: HOME | End: 2024-12-20
Payer: COMMERCIAL

## 2024-12-20 ENCOUNTER — HOSPITAL ENCOUNTER (OUTPATIENT)
Dept: GENERAL RADIOLOGY | Facility: HOSPITAL | Age: 64
Discharge: HOME | End: 2024-12-20
Payer: COMMERCIAL

## 2024-12-20 DIAGNOSIS — Z01.811 PRE-OP CHEST EXAM: Primary | ICD-10-CM

## 2024-12-20 DIAGNOSIS — Z01.818 PRE-OP EXAMINATION: ICD-10-CM

## 2024-12-20 DIAGNOSIS — Z01.818 PRE-OP EXAM: ICD-10-CM

## 2024-12-20 DIAGNOSIS — Z01.818 PRE-OP EXAMINATION: Primary | ICD-10-CM

## 2024-12-20 LAB
ANION GAP SERPL CALCULATED.3IONS-SCNC: 9.7 MMOL/L (ref 5–15)
BUN SERPL-MCNC: 11 MG/DL (ref 8–23)
BUN/CREAT SERPL: 14.7 (ref 7–25)
CALCIUM SPEC-SCNC: 8.8 MG/DL (ref 8.6–10.5)
CHLORIDE SERPL-SCNC: 107 MMOL/L (ref 98–107)
CO2 SERPL-SCNC: 25.3 MMOL/L (ref 22–29)
CREAT SERPL-MCNC: 0.75 MG/DL (ref 0.57–1)
DEPRECATED RDW RBC AUTO: 40.7 FL (ref 37–54)
EGFRCR SERPLBLD CKD-EPI 2021: 89.6 ML/MIN/1.73
ERYTHROCYTE [DISTWIDTH] IN BLOOD BY AUTOMATED COUNT: 12.1 % (ref 12.3–15.4)
GLUCOSE SERPL-MCNC: 81 MG/DL (ref 65–99)
HCT VFR BLD AUTO: 41.2 % (ref 34–46.6)
HGB BLD-MCNC: 14 G/DL (ref 12–15.9)
MCH RBC QN AUTO: 31.1 PG (ref 26.6–33)
MCHC RBC AUTO-ENTMCNC: 34 G/DL (ref 31.5–35.7)
MCV RBC AUTO: 91.6 FL (ref 79–97)
PLATELET # BLD AUTO: 355 10*3/MM3 (ref 140–450)
PMV BLD AUTO: 9.9 FL (ref 6–12)
POTASSIUM SERPL-SCNC: 4.2 MMOL/L (ref 3.5–5.2)
QT INTERVAL: 418 MS
QTC INTERVAL: 382 MS
RBC # BLD AUTO: 4.5 10*6/MM3 (ref 3.77–5.28)
SODIUM SERPL-SCNC: 142 MMOL/L (ref 136–145)
WBC NRBC COR # BLD AUTO: 6.85 10*3/MM3 (ref 3.4–10.8)

## 2024-12-20 PROCEDURE — 36415 COLL VENOUS BLD VENIPUNCTURE: CPT

## 2024-12-20 PROCEDURE — 93005 ELECTROCARDIOGRAM TRACING: CPT

## 2024-12-20 PROCEDURE — 85027 COMPLETE CBC AUTOMATED: CPT

## 2024-12-20 PROCEDURE — 71046 X-RAY EXAM CHEST 2 VIEWS: CPT

## 2024-12-20 PROCEDURE — 80048 BASIC METABOLIC PNL TOTAL CA: CPT

## 2025-05-28 ENCOUNTER — PATIENT ROUNDING (BHMG ONLY) (OUTPATIENT)
Dept: URGENT CARE | Facility: CLINIC | Age: 65
End: 2025-05-28
Payer: COMMERCIAL

## 2025-05-29 NOTE — ED NOTES
Thank you for letting us care for you in your recent visit to our urgent care center. We would love to hear about your experience with us. Was this the first time you have visited our location?    We’re always looking for ways to make our patients’ experiences even better. Do you have any recommendations on ways we may improve?     I appreciate you taking the time to respond. Please be on the lookout for a survey about your recent visit from [employer] via text or email. We would greatly appreciate if you could fill that out and turn it back in. We want your voice to be heard and we value your feedback.   Thank you for choosing [facility] for your healthcare needs.

## 2025-06-11 ENCOUNTER — OFFICE VISIT (OUTPATIENT)
Dept: CARDIOLOGY | Age: 65
End: 2025-06-11
Payer: COMMERCIAL

## 2025-06-11 VITALS
HEART RATE: 60 BPM | BODY MASS INDEX: 24.8 KG/M2 | HEIGHT: 67 IN | DIASTOLIC BLOOD PRESSURE: 78 MMHG | SYSTOLIC BLOOD PRESSURE: 116 MMHG | WEIGHT: 158 LBS

## 2025-06-11 DIAGNOSIS — I25.10 CORONARY ARTERY CALCIFICATION: Primary | ICD-10-CM

## 2025-06-11 DIAGNOSIS — K44.9 HIATAL HERNIA: ICD-10-CM

## 2025-06-11 DIAGNOSIS — I35.1 NONRHEUMATIC AORTIC VALVE INSUFFICIENCY: ICD-10-CM

## 2025-06-11 PROCEDURE — 93000 ELECTROCARDIOGRAM COMPLETE: CPT | Performed by: INTERNAL MEDICINE

## 2025-06-11 PROCEDURE — 99214 OFFICE O/P EST MOD 30 MIN: CPT | Performed by: INTERNAL MEDICINE

## 2025-06-11 NOTE — PROGRESS NOTES
Date of Office Visit: 2025  Encounter Provider: Perla Smith MD  Place of Service: [facility] CARDIOLOGY  Patient Name: Samreen Linda  :1960      Patient ID:  Samreen Linda is a 64 y.o. female is here for  followup for coronary risk factors.        History of Present Illness    She has a history of coronary calcification, hyperlipidemia, hiatal hernia.    She has a family history of myocardial infarction.     Her dad had myocardial infarction between the age of 60-65, her mom had myocardial infarction to the age of 60-65.  Her maternal grandparents both had coronary artery disease with her maternal grandmother dying at 76 with myocardial infarction and her maternal grandfather dying at 85 with myocardial infarction.  Her paternal grandfather  of myocardial infarction at 76.  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    Labs in 2024 show normal CMP.  Echocardiogram done 2024 showed ejection fraction 61% with global longitudinal strain of -25.3%, normal diastolic function, normal RVSP, no significant valvular abnormalities.  When compared to echocardiogram from , aortic insufficiency had improved.  Calcium score done 2023 showed a total score of 28.1 with calcification noted in the LAD, review of those images showed the calcification of the LAD to be in the midportion of the vessel.  There was also noted to be a small to moderate size hiatal hernia.    She has arthritis but overall has been very healthy.  She denies chest pain with activity.  She has no orthopnea or PND.  She does not feel her heart racing or skipping.  She has no exertional dyspnea.  She is on no cardiac medications at this time.  Her energy level stable.    Past Medical History:   Diagnosis Date    Cataract     Coronary artery calcification 2024    COVID-19 2024     returned from [place] and tested positive on 24    Family history of heart attack     GERD (gastroesophageal reflux disease)     Hiatal hernia     History of UTI     Mixed hyperlipidemia 2023    Trouble swallowing     dry foods, breads         Past Surgical History:   Procedure Laterality Date    APPENDECTOMY       SECTION      COLONOSCOPY      ENDOSCOPY N/A 2024    Procedure: ESOPHAGOGASTRODUODENOSCOPY with cold biopsies;  Surgeon: Rico Reinoso MD;  Location: [facility] ENDOSCOPY;  Service: Gastroenterology;  Laterality: N/A;  hiatal hernia//hiatal hernia, esophagitis    FOOT SURGERY Bilateral     multiple foot surgeries       Current Outpatient Medications on File Prior to Visit   Medication Sig Dispense Refill    Cholecalciferol (VITAMIN D) 1000 units tablet Take 5 tablets by mouth Daily.      clobetasol (TEMOVATE) 0.05 % ointment Apply 1 Application topically to the appropriate area as directed As Needed.      cyclobenzaprine (FLEXERIL) 10 MG tablet Take 1 tablet by mouth 3 (Three) Times a Day As Needed for Muscle Spasms. 20 tablet 0    Multiple Minerals-Vitamins (MAGDIEL MAG ZINC +D3 PO) Take 1 tablet by mouth Daily.      tretinoin (RETIN-A) 0.1 % cream APPLY 1 APPLICATION ON THE SKIN AT BEDTIME APPLY PEA SIZED AMOUNT TO FACE NIGHTLY AS TOLERATED.      [DISCONTINUED] Dexchlorphen-PSE-Methscop (D-HIST D PO) Take 1 tablet by mouth Daily.       No current facility-administered medications on file prior to visit.       Social History     Socioeconomic History    Marital status:     Number of children: 1   Tobacco Use    Smoking status: Never     Passive exposure: Never    Smokeless tobacco: Never    Tobacco comments:     Caffeine: tea daily   Vaping Use    Vaping status: Never Used   Substance and Sexual Activity    Alcohol use: No    Drug use: Never    Sexual activity: Yes     Birth control/protection: Post-menopausal             Procedures    ECG 12 Lead    Date/Time:  "6/11/2025 9:56 AM  Performed by: Perla Smith MD    Authorized by: ePrla Smith MD  Comparison: compared with previous ECG   Similar to previous ECG  Rhythm: sinus rhythm    Clinical impression: normal ECG              Objective:      Vitals:    06/11/25 0927   BP: 116/78   Pulse: 60   Weight: 71.7 kg (158 lb)   Height: 170.2 cm (67\")     Body mass index is 24.75 kg/m².    Vitals reviewed.   Constitutional:       General: Not in acute distress.     Appearance: Not diaphoretic.   Neck:      Vascular: No carotid bruit or JVD.   Pulmonary:      Effort: Pulmonary effort is normal.      Breath sounds: Normal breath sounds.   Cardiovascular:      Normal rate. Regular rhythm.      No gallop.    Pulses:     Intact distal pulses.      Carotid: 2+ bilaterally.     Radial: 2+ bilaterally.     Dorsalis pedis: 2+ bilaterally.     Posterior tibial: 2+ bilaterally.  Edema:     Peripheral edema absent.   Neurological:      Cranial Nerves: No cranial nerve deficit.         Lab Review:       Assessment:      Diagnosis Plan   1. Coronary artery calcification  Apolipoprotein B    Lipoprotein A (LPA)    High Sensitivity CRP    Uric Acid    TSH    Magnesium    Magnesium    Uric Acid    TSH    Lipoprotein A (LPA)    Apolipoprotein B    High Sensitivity CRP      2. Nonrheumatic aortic valve insufficiency  Apolipoprotein B    Lipoprotein A (LPA)    High Sensitivity CRP    Uric Acid    TSH    Magnesium    Magnesium    Uric Acid    TSH    Lipoprotein A (LPA)    Apolipoprotein B    High Sensitivity CRP      3. Hiatal hernia  Magnesium    Uric Acid    TSH    Lipoprotein A (LPA)    Apolipoprotein B    High Sensitivity CRP          Coronary artery calcification, total score 28 concentrated in the mid LAD done in 2023..  Aortic insufficiency, mild  Mixed hyperlipidemia with high LDL and low HDL, no medication changes for now.  She may need statin therapies if she has an elevated calcium score.  Hiatal hernia       Plan:     "   Check laboratory values today including apolipoprotein B, LP(a), hs-CRP, uric acid, TSH and magnesium level.  Consider calcium score next year, no other testing at this time, no medication changes, see Morenita in 1 year.

## 2025-06-13 ENCOUNTER — RESULTS FOLLOW-UP (OUTPATIENT)
Dept: CARDIOLOGY | Age: 65
End: 2025-06-13
Payer: COMMERCIAL

## 2025-06-13 LAB
APO B SERPL-MCNC: 115 MG/DL
CRP SERPL HS-MCNC: 31.35 MG/L (ref 0–3)
LPA SERPL-SCNC: 184.7 NMOL/L
MAGNESIUM SERPL-MCNC: 2.4 MG/DL (ref 1.6–2.4)
TSH SERPL DL<=0.005 MIU/L-ACNC: 1.39 UIU/ML (ref 0.27–4.2)
URATE SERPL-MCNC: 5 MG/DL (ref 2.4–5.7)

## 2025-06-13 NOTE — TELEPHONE ENCOUNTER
Please call, lipoprotein a and apolipoprotein B levels are elevated, so his high-sensitivity CRP.  This increases the risk of progressive coronary artery disease in the absence of treatment such as statins.  Find out if she is willing to try rosuvastatin 10 mg nightly as initial therapy.

## 2025-06-17 ENCOUNTER — OFFICE VISIT (OUTPATIENT)
Dept: CARDIOLOGY | Facility: CLINIC | Age: 65
End: 2025-06-17
Payer: COMMERCIAL

## 2025-06-17 VITALS
DIASTOLIC BLOOD PRESSURE: 80 MMHG | SYSTOLIC BLOOD PRESSURE: 110 MMHG | BODY MASS INDEX: 25.11 KG/M2 | HEART RATE: 69 BPM | HEIGHT: 67 IN | WEIGHT: 160 LBS | OXYGEN SATURATION: 99 %

## 2025-06-17 DIAGNOSIS — E78.41 ELEVATED LIPOPROTEIN(A): ICD-10-CM

## 2025-06-17 DIAGNOSIS — K44.9 HIATAL HERNIA: ICD-10-CM

## 2025-06-17 DIAGNOSIS — I25.10 CORONARY ARTERY CALCIFICATION: Primary | ICD-10-CM

## 2025-06-17 PROCEDURE — 99214 OFFICE O/P EST MOD 30 MIN: CPT | Performed by: INTERNAL MEDICINE

## 2025-06-17 RX ORDER — ASPIRIN 81 MG/1
81 TABLET ORAL DAILY
Qty: 90 TABLET | Refills: 3 | Status: SHIPPED | OUTPATIENT
Start: 2025-06-17

## 2025-06-17 RX ORDER — IBUPROFEN 200 MG
200 TABLET ORAL EVERY 6 HOURS PRN
COMMUNITY

## 2025-06-17 RX ORDER — ROSUVASTATIN CALCIUM 5 MG/1
2.5 TABLET, COATED ORAL NIGHTLY
Qty: 45 TABLET | Refills: 3 | Status: SHIPPED | OUTPATIENT
Start: 2025-06-17

## 2025-06-17 NOTE — PROGRESS NOTES
Date of Office Visit: 2025  Encounter Provider: Perla Smith MD  Place of Service: [facility] CARDIOLOGY  Patient Name: Samreen Linda  :1960      Patient ID:  Samreen Linda is a 64 y.o. female is here for  followup for coronary risk factors.        History of Present Illness    She has a history of coronary calcification, hyperlipidemia, hiatal hernia, arthritis.    She has a family history of myocardial infarction.     Her dad had myocardial infarction between the age of 60-65, her mom had myocardial infarction to the age of 60-65.  Her maternal grandparents both had coronary artery disease with her maternal grandmother dying at 76 with myocardial infarction and her maternal grandfather dying at 85 with myocardial infarction.  Her paternal grandfather  of myocardial infarction at 76.  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    Labs in 2024 show normal CMP.  Echocardiogram done 2024 showed ejection fraction 61% with global longitudinal strain of -25.3%, normal diastolic function, normal RVSP, no significant valvular abnormalities.  When compared to echocardiogram from , aortic insufficiency had improved.      Calcium score done 2023 showed a total score of 28.1 with calcification noted in the LAD, review of those images showed the calcification of the LAD to be in the midportion of the vessel.  There was also a small to moderate size hiatal hernia.    Labs on 2025 showed LP(a) elevated at 184(normal is <75), apolipoprotein B elevated at 115(normal is < 90), high-sensitivity CRP elevated at 31.5, normal TSH, normal magnesium, normal uric acid.    She stays active and has no difficulty with chest pain, pressure, tachycardia, dizziness, syncope.  She does have a lot of medication intolerances and reactions.  She has always had  this.    Labs on 2025 at PCP show HDL 45, total cholesterol 181, triglycerides 139, , non-, normal CMP, unremarkable CBC.    Past Medical History:   Diagnosis Date    Cataract     Coronary artery calcification 2024    COVID-19 2024    returned from [place] and tested positive on 24    Family history of heart attack     GERD (gastroesophageal reflux disease)     Hiatal hernia     History of UTI     Mixed hyperlipidemia 2023    Trouble swallowing     dry foods, breads         Past Surgical History:   Procedure Laterality Date    APPENDECTOMY       SECTION      COLONOSCOPY  2016    ENDOSCOPY N/A 2024    Procedure: ESOPHAGOGASTRODUODENOSCOPY with cold biopsies;  Surgeon: Rico Reinoso MD;  Location: [facility] ENDOSCOPY;  Service: Gastroenterology;  Laterality: N/A;  hiatal hernia//hiatal hernia, esophagitis    FOOT SURGERY Bilateral     multiple foot surgeries       Current Outpatient Medications on File Prior to Visit   Medication Sig Dispense Refill    CALCIUM-MAGNESIUM-VITAMIN D PO Take 1 tablet by mouth 2 (Two) Times a Day.      Cholecalciferol (VITAMIN D) 1000 units tablet Take 5 tablets by mouth Daily.      clobetasol (TEMOVATE) 0.05 % ointment Apply 1 Application topically to the appropriate area as directed As Needed.      Dexchlorphen-PSE-Methscop (D-HIST D PO) Take 2 tablets by mouth Daily.      ibuprofen (ADVIL,MOTRIN) 200 MG tablet Take 1 tablet by mouth Every 6 (Six) Hours As Needed for Mild Pain.      tretinoin (RETIN-A) 0.1 % cream APPLY 1 APPLICATION ON THE SKIN AT BEDTIME APPLY PEA SIZED AMOUNT TO FACE NIGHTLY AS TOLERATED.      cyclobenzaprine (FLEXERIL) 10 MG tablet Take 1 tablet by mouth 3 (Three) Times a Day As Needed for Muscle Spasms. 20 tablet 0    Multiple Minerals-Vitamins (MAGDIEL MAG ZINC +D3 PO) Take 1 tablet by mouth Daily.       No current facility-administered medications on file prior to visit.       Social History  "    Socioeconomic History    Marital status:     Number of children: 1   Tobacco Use    Smoking status: Never     Passive exposure: Never    Smokeless tobacco: Never    Tobacco comments:     Caffeine: tea daily   Vaping Use    Vaping status: Never Used   Substance and Sexual Activity    Alcohol use: No    Drug use: Never    Sexual activity: Yes     Birth control/protection: Post-menopausal             Procedures  Procedures        Objective:      Vitals:    06/17/25 1404   BP: 110/80   Pulse: 69   SpO2: 99%   Weight: 72.6 kg (160 lb)   Height: 170.2 cm (67\")     Body mass index is 25.06 kg/m².    Vitals reviewed.   Constitutional:       General: Not in acute distress.     Appearance: Not diaphoretic.   Neck:      Vascular: No carotid bruit or JVD.   Pulmonary:      Effort: Pulmonary effort is normal.      Breath sounds: Normal breath sounds.   Cardiovascular:      Normal rate. Regular rhythm.      No gallop.    Pulses:     Intact distal pulses.      Carotid: 2+ bilaterally.     Radial: 2+ bilaterally.     Dorsalis pedis: 2+ bilaterally.     Posterior tibial: 2+ bilaterally.  Edema:     Peripheral edema absent.   Neurological:      Cranial Nerves: No cranial nerve deficit.         Lab Review:       Assessment:      Diagnosis Plan   1. Coronary artery calcification  Lipid Panel    Lipoprotein A (LPA)    Apolipoprotein B      2. Hiatal hernia        3. Elevated lipoprotein(a)  Lipid Panel    Lipoprotein A (LPA)    Apolipoprotein B            Coronary artery calcification, total score 28 concentrated in the mid LAD done in 2023.  Aortic insufficiency, mild  Mixed hyperlipidemia with high LDL and low HDL  Hiatal hernia      See Morenita in 3 months with labs done right before that visit.,  Try rosuvastatin 2.5 mg nightly and aspirin enteric-coated 81 mg daily.  She has a lot of medication intolerances.  Will see if she can tolerate this.  She can remain on vitamin D and calcium as well as allergy medications.  "

## (undated) DEVICE — KT ORCA ORCAPOD DISP STRL

## (undated) DEVICE — BLCK/BITE BLOX W/DENTL/RIM W/STRAP 54F

## (undated) DEVICE — ADAPT CLN BIOGUARD AIR/H2O DISP

## (undated) DEVICE — TUBING, SUCTION, 1/4" X 10', STRAIGHT: Brand: MEDLINE

## (undated) DEVICE — FRCP BX RADJAW4 NDL 2.8 240CM LG OG BX40

## (undated) DEVICE — SENSR O2 OXIMAX FNGR A/ 18IN NONSTR

## (undated) DEVICE — MSK ENDO PORT O2 POM ELITE CURAPLEX A/

## (undated) DEVICE — LN SMPL CO2 SHTRM SD STREAM W/M LUER